# Patient Record
Sex: MALE | Race: WHITE | NOT HISPANIC OR LATINO | URBAN - METROPOLITAN AREA
[De-identification: names, ages, dates, MRNs, and addresses within clinical notes are randomized per-mention and may not be internally consistent; named-entity substitution may affect disease eponyms.]

---

## 2024-07-10 ENCOUNTER — LAB OUTSIDE AN ENCOUNTER (OUTPATIENT)
Dept: URBAN - METROPOLITAN AREA CLINIC 74 | Facility: CLINIC | Age: 59
End: 2024-07-10

## 2024-07-10 ENCOUNTER — DASHBOARD ENCOUNTERS (OUTPATIENT)
Age: 59
End: 2024-07-10

## 2024-07-10 ENCOUNTER — OFFICE VISIT (OUTPATIENT)
Dept: URBAN - METROPOLITAN AREA CLINIC 74 | Facility: CLINIC | Age: 59
End: 2024-07-10
Payer: COMMERCIAL

## 2024-07-10 VITALS
BODY MASS INDEX: 30.05 KG/M2 | DIASTOLIC BLOOD PRESSURE: 80 MMHG | HEIGHT: 66 IN | HEART RATE: 93 BPM | SYSTOLIC BLOOD PRESSURE: 120 MMHG | WEIGHT: 187 LBS | TEMPERATURE: 98.5 F | OXYGEN SATURATION: 95 %

## 2024-07-10 DIAGNOSIS — K21.9 GASTROESOPHAGEAL REFLUX DISEASE, UNSPECIFIED WHETHER ESOPHAGITIS PRESENT: ICD-10-CM

## 2024-07-10 DIAGNOSIS — R11.2 NAUSEA AND VOMITING IN ADULT: ICD-10-CM

## 2024-07-10 DIAGNOSIS — B18.2 CHRONIC HEPATITIS C WITHOUT HEPATIC COMA: ICD-10-CM

## 2024-07-10 DIAGNOSIS — R68.81 EARLY SATIETY: ICD-10-CM

## 2024-07-10 PROBLEM — 16331000: Status: ACTIVE | Noted: 2024-07-10

## 2024-07-10 PROBLEM — 16932000: Status: ACTIVE | Noted: 2024-07-10

## 2024-07-10 PROBLEM — 79922009: Status: ACTIVE | Noted: 2024-07-10

## 2024-07-10 PROBLEM — 235595009: Status: ACTIVE | Noted: 2024-07-10

## 2024-07-10 PROBLEM — 442076002: Status: ACTIVE | Noted: 2024-07-10

## 2024-07-10 PROBLEM — 371434005: Status: ACTIVE | Noted: 2024-07-10

## 2024-07-10 PROBLEM — 267036007: Status: ACTIVE | Noted: 2024-07-10

## 2024-07-10 PROBLEM — 128302006: Status: ACTIVE | Noted: 2024-07-10

## 2024-07-10 PROBLEM — 365980008: Status: ACTIVE | Noted: 2024-07-10

## 2024-07-10 PROCEDURE — 99204 OFFICE O/P NEW MOD 45 MIN: CPT | Performed by: INTERNAL MEDICINE

## 2024-07-10 PROCEDURE — 99244 OFF/OP CNSLTJ NEW/EST MOD 40: CPT | Performed by: INTERNAL MEDICINE

## 2024-07-10 RX ORDER — PANTOPRAZOLE SODIUM 40 MG/1
1 TABLET TABLET, DELAYED RELEASE ORAL TWICE DAILY
Qty: 180 | Refills: 3 | OUTPATIENT
Start: 2024-07-10

## 2024-07-10 RX ORDER — AMLODIPINE BESYLATE 5 MG/1
TAKE 1 TABLET BY MOUTH ONCE DAILY FOR BLOOD PRESSURE FOR 90 DAYS TABLET ORAL
Qty: 90 EACH | Refills: 0 | Status: ACTIVE | COMMUNITY

## 2024-07-10 RX ORDER — TRIAMCINOLONE ACETONIDE 1 MG/G
APPLY A THIN LAYER TO THE AFFECTED AREA TOPICALLY TWICE DAILY CREAM TOPICAL
Qty: 15 GRAM | Refills: 0 | Status: ACTIVE | COMMUNITY

## 2024-07-10 RX ORDER — DULOXETINE 60 MG/1
TAKE 1 CAPSULE BY MOUTH ONCE DAILY FOR 30 DAYS CAPSULE, DELAYED RELEASE ORAL
Qty: 30 EACH | Refills: 0 | Status: ACTIVE | COMMUNITY

## 2024-07-10 RX ORDER — TRAMADOL HYDROCHLORIDE 50 MG/1
TAKE 1 TABLET BY MOUTH EVERY 12 HOURS AS NEEDED TABLET, FILM COATED ORAL
Qty: 14 EACH | Refills: 0 | Status: ACTIVE | COMMUNITY

## 2024-07-10 RX ORDER — SODIUM, POTASSIUM,MAG SULFATES 17.5-3.13G
AS DIRECTED SOLUTION, RECONSTITUTED, ORAL ORAL
OUTPATIENT
Start: 2024-07-10

## 2024-07-10 RX ORDER — HYDROCHLOROTHIAZIDE 25 MG/1
TABLET ORAL
Qty: 90 TABLET | Status: ACTIVE | COMMUNITY

## 2024-07-10 RX ORDER — METFORMIN ER 500 MG 500 MG/1
TAKE 1 TABLET BY MOUTH TWICE DAILY TABLET ORAL
Qty: 180 EACH | Refills: 0 | Status: ACTIVE | COMMUNITY

## 2024-07-10 RX ORDER — GLIPIZIDE 5 MG/1
TABLET, FILM COATED, EXTENDED RELEASE ORAL
Qty: 30 TABLET | Status: ACTIVE | COMMUNITY

## 2024-07-10 RX ORDER — LISINOPRIL 20 MG/1
TABLET ORAL
Qty: 90 TABLET | Status: ACTIVE | COMMUNITY

## 2024-07-10 NOTE — PHYSICAL EXAM GASTROINTESTINAL
Abdomen , soft, tender epigastrium, nondistended , no guarding or rigidity , no masses palpable , normal bowel sounds , Liver and Spleen,  no hepatosplenomegaly , liver nontender

## 2024-07-10 NOTE — HPI-TODAY'S VISIT:
The patient is a 58-year-old  male patient of Dr. Deb Frazier who is referred for consultation, a copy of these document is being forwarded to the referring physician.  The patient presents to the office stating that he has experienced epigastric abdominal pain for several months, the pain is sharp in nature, seems to be constant, not improved by ingestion of food, unrelated to position or physical activity and associated with frequent gastroesophageal reflux and heartburn.  The patient complaint of early satiety, denies having any dysphagia, odynophagia, did have postprandial nausea with occasional vomiting of white mucus.  The patient does have a history of heavy tobacco use, denies using anti-inflammatories or aspirin, used to drink very heavily for over 25 years until 8 years ago.  The patient claims having normal bowel movements, mostly type IV stools on the [place] scale with no blood, there was no hematemesis melena or hematochezia.  The patient denied any unexplained weight loss, there is no family history of colon cancer or colon polyps.  The patient was recently diagnosed with hepatitis C, the patient was initially found to have a reactive antibody to hepatitis C and subsequently a positive HCV RNA quantitative real-time PCR with [id number] international units or 5.89 log IU/mL.  The patient has multiple tattoos, denies any previous intravenous drug use, denies sharing blades or toothbrushes.  The patient's girlfriend was treated for hepatitis C by Dr. De Jesus with [facility] about 8 years ago, she became hep C negative.  The patient is a diabetic, takes tramadol for back pain,  The patient has been advised to get a CBC, CMP with fib 4 index, lipase, and will be tested for hepatitis B and hepatitis A.    A  CT of abdomen and pelvis with contrast as long as the renal function allows it, the patient is to be scheduled to have an EGD and a colonoscopy for screening purposes, he has never had 1 before.  Benefits potential complications and alternatives to both procedures were disclosed.  The patient will require pulmonary and cardiology clearance before being able to have the procedures.  The patient will return for a follow-up visit after completion of testing.

## 2024-07-13 LAB
ABSOLUTE BASOPHILS: 48
ABSOLUTE EOSINOPHILS: 218
ABSOLUTE LYMPHOCYTES: 2747
ABSOLUTE MONOCYTES: 1186
ABSOLUTE NEUTROPHILS: 7901
ALBUMIN/GLOBULIN RATIO: 1.5
ALBUMIN: 4.4
ALKALINE PHOSPHATASE: 96
ALT: 42
AST: 22
BASOPHILS: 0.4
BILIRUBIN, TOTAL: 0.9
BUN/CREATININE RATIO: 33
CALCIUM: 10.3
CARBON DIOXIDE: 28
CHLORIDE: 97
CREATININE: 0.82
EGFR: 101
EOSINOPHILS: 1.8
FIB 4 INDEX: 0.58
GLOBULIN: 2.9
GLUCOSE: 130
HBSAG SCREEN: (no result)
HCV RNA, QUANTITATIVE REAL TIME PCR: (no result)
HCV RNA, QUANTITATIVE REAL TIME PCR: 6.14
HEMATOCRIT: 46.4
HEMOGLOBIN: 15.7
HEP A AB, IGM: (no result)
HEP B CORE AB, IGM: (no result)
HEPATITIS C ANTIBODY: REACTIVE
LIPASE: 23
LYMPHOCYTES: 22.7
MCH: 30
MCHC: 33.8
MCV: 88.7
MONOCYTES: 9.8
MPV: 11
NEUTROPHILS: 65.3
PLATELET COUNT: 352
PLATELET COUNT: 352
POTASSIUM: 3.7
PROTEIN, TOTAL: 7.3
RDW: 13.5
RED BLOOD CELL COUNT: 5.23
SODIUM: 137
UREA NITROGEN (BUN): 27
WHITE BLOOD CELL COUNT: 12.1

## 2024-08-15 ENCOUNTER — TELEPHONE ENCOUNTER (OUTPATIENT)
Dept: URBAN - METROPOLITAN AREA CLINIC 74 | Facility: CLINIC | Age: 59
End: 2024-08-15

## 2024-08-16 ENCOUNTER — OFFICE VISIT (OUTPATIENT)
Dept: URBAN - METROPOLITAN AREA CLINIC 74 | Facility: CLINIC | Age: 59
End: 2024-08-16

## 2024-08-16 RX ORDER — LISINOPRIL 20 MG/1
TABLET ORAL
Qty: 90 TABLET | Status: ACTIVE | COMMUNITY

## 2024-08-16 RX ORDER — DULOXETINE 60 MG/1
TAKE 1 CAPSULE BY MOUTH ONCE DAILY FOR 30 DAYS CAPSULE, DELAYED RELEASE ORAL
Qty: 30 EACH | Refills: 0 | Status: ACTIVE | COMMUNITY

## 2024-08-16 RX ORDER — AMLODIPINE BESYLATE 5 MG/1
TAKE 1 TABLET BY MOUTH ONCE DAILY FOR BLOOD PRESSURE FOR 90 DAYS TABLET ORAL
Qty: 90 EACH | Refills: 0 | Status: ACTIVE | COMMUNITY

## 2024-08-16 RX ORDER — METFORMIN ER 500 MG 500 MG/1
TAKE 1 TABLET BY MOUTH TWICE DAILY TABLET ORAL
Qty: 180 EACH | Refills: 0 | Status: ACTIVE | COMMUNITY

## 2024-08-16 RX ORDER — PANTOPRAZOLE SODIUM 40 MG/1
1 TABLET TABLET, DELAYED RELEASE ORAL TWICE DAILY
Qty: 180 | Refills: 3 | Status: ACTIVE | COMMUNITY
Start: 2024-07-10

## 2024-08-16 RX ORDER — GLIPIZIDE 5 MG/1
TABLET, FILM COATED, EXTENDED RELEASE ORAL
Qty: 30 TABLET | Status: ACTIVE | COMMUNITY

## 2024-08-16 RX ORDER — TRAMADOL HYDROCHLORIDE 50 MG/1
TAKE 1 TABLET BY MOUTH EVERY 12 HOURS AS NEEDED TABLET, FILM COATED ORAL
Qty: 14 EACH | Refills: 0 | Status: ACTIVE | COMMUNITY

## 2024-08-16 RX ORDER — HYDROCHLOROTHIAZIDE 25 MG/1
TABLET ORAL
Qty: 90 TABLET | Status: ACTIVE | COMMUNITY

## 2024-08-16 RX ORDER — SODIUM, POTASSIUM,MAG SULFATES 17.5-3.13G
AS DIRECTED SOLUTION, RECONSTITUTED, ORAL ORAL
Status: ACTIVE | COMMUNITY
Start: 2024-07-10

## 2024-08-16 RX ORDER — TRIAMCINOLONE ACETONIDE 1 MG/G
APPLY A THIN LAYER TO THE AFFECTED AREA TOPICALLY TWICE DAILY CREAM TOPICAL
Qty: 15 GRAM | Refills: 0 | Status: ACTIVE | COMMUNITY

## 2024-08-16 NOTE — HPI-TODAY'S VISIT:
The patient is 58-year-old male with past medical history as noted below known to Dr. Stern is returning to our clinic today to discuss his recent lab results and to schedule EGD and colonoscopy.  The patient was seen on 07/10/2024 for the first time in our clinic by Dr. Stern with complaint of epigastric abdominal pain associated with a sharp pain radiated to upper back, increased pain after meals, frequent gastroesophageal reflux, and a fairly sedentary.  The patient was treated in the past for chronic hepatitis C, genotype Ia with [facility] over 8 years ago.  The patient has known history of alcohol and tobacco abuse.  Last visit blood work was ordered as well as CT scan of the abdomen and pelvis with follow-up to schedule EGD and colonoscopy after obtaining pulmonary clearance prior to procedure.  Diagnostic studies: -- Labs on 07/10/2024 CMP with BUN 27, creatinine 0.82, ALP 96, AST 22, ALT 42, and TB 0.9.  CBC with WBC 12.1, hemoglobin 15.7, hematocrit 46.4, and platelet 352.  Lipase 23. FIB-4 INDEX 0.58.  Hepatitis C antibody positive.  Hepatitis C viral RNA 1380,000.